# Patient Record
Sex: MALE | Race: WHITE | NOT HISPANIC OR LATINO | URBAN - METROPOLITAN AREA
[De-identification: names, ages, dates, MRNs, and addresses within clinical notes are randomized per-mention and may not be internally consistent; named-entity substitution may affect disease eponyms.]

---

## 2017-04-09 ENCOUNTER — EMERGENCY (EMERGENCY)
Facility: HOSPITAL | Age: 22
LOS: 1 days | Discharge: OTHER INSTITUTION | End: 2017-04-09
Attending: EMERGENCY MEDICINE | Admitting: EMERGENCY MEDICINE
Payer: COMMERCIAL

## 2017-04-09 VITALS — WEIGHT: 199.96 LBS | HEIGHT: 70 IN

## 2017-04-09 DIAGNOSIS — R69 ILLNESS, UNSPECIFIED: ICD-10-CM

## 2017-04-09 DIAGNOSIS — R44.3 HALLUCINATIONS, UNSPECIFIED: ICD-10-CM

## 2017-04-09 DIAGNOSIS — F20.0 PARANOID SCHIZOPHRENIA: ICD-10-CM

## 2017-04-09 LAB
ALBUMIN SERPL ELPH-MCNC: 5.1 G/DL — SIGNIFICANT CHANGE UP (ref 3.3–5.2)
ALP SERPL-CCNC: 74 U/L — SIGNIFICANT CHANGE UP (ref 40–120)
ALT FLD-CCNC: 57 U/L — HIGH
AMPHET UR-MCNC: NEGATIVE — SIGNIFICANT CHANGE UP
ANION GAP SERPL CALC-SCNC: 10 MMOL/L — SIGNIFICANT CHANGE UP (ref 5–17)
APPEARANCE UR: CLEAR — SIGNIFICANT CHANGE UP
AST SERPL-CCNC: 33 U/L — SIGNIFICANT CHANGE UP
BARBITURATES UR SCN-MCNC: NEGATIVE — SIGNIFICANT CHANGE UP
BASOPHILS # BLD AUTO: 0 K/UL — SIGNIFICANT CHANGE UP (ref 0–0.2)
BASOPHILS NFR BLD AUTO: 0.1 % — SIGNIFICANT CHANGE UP (ref 0–2)
BENZODIAZ UR-MCNC: NEGATIVE — SIGNIFICANT CHANGE UP
BILIRUB SERPL-MCNC: 0.5 MG/DL — SIGNIFICANT CHANGE UP (ref 0.4–2)
BILIRUB UR-MCNC: NEGATIVE — SIGNIFICANT CHANGE UP
BUN SERPL-MCNC: 14 MG/DL — SIGNIFICANT CHANGE UP (ref 8–20)
CALCIUM SERPL-MCNC: 10.5 MG/DL — HIGH (ref 8.6–10.2)
CHLORIDE SERPL-SCNC: 96 MMOL/L — LOW (ref 98–107)
CO2 SERPL-SCNC: 28 MMOL/L — SIGNIFICANT CHANGE UP (ref 22–29)
COCAINE METAB.OTHER UR-MCNC: NEGATIVE — SIGNIFICANT CHANGE UP
COLOR SPEC: YELLOW — SIGNIFICANT CHANGE UP
CREAT SERPL-MCNC: 0.94 MG/DL — SIGNIFICANT CHANGE UP (ref 0.5–1.3)
DIFF PNL FLD: NEGATIVE — SIGNIFICANT CHANGE UP
EOSINOPHIL # BLD AUTO: 0 K/UL — SIGNIFICANT CHANGE UP (ref 0–0.5)
EOSINOPHIL NFR BLD AUTO: 0.4 % — SIGNIFICANT CHANGE UP (ref 0–5)
EPI CELLS # UR: SIGNIFICANT CHANGE UP
GLUCOSE SERPL-MCNC: 97 MG/DL — SIGNIFICANT CHANGE UP (ref 70–115)
GLUCOSE UR QL: NEGATIVE MG/DL — SIGNIFICANT CHANGE UP
HCT VFR BLD CALC: 45.5 % — SIGNIFICANT CHANGE UP (ref 42–52)
HGB BLD-MCNC: 15.8 G/DL — SIGNIFICANT CHANGE UP (ref 14–18)
KETONES UR-MCNC: NEGATIVE — SIGNIFICANT CHANGE UP
LEUKOCYTE ESTERASE UR-ACNC: ABNORMAL
LYMPHOCYTES # BLD AUTO: 1.3 K/UL — SIGNIFICANT CHANGE UP (ref 1–4.8)
LYMPHOCYTES # BLD AUTO: 18.1 % — LOW (ref 20–55)
MCHC RBC-ENTMCNC: 31.9 PG — HIGH (ref 27–31)
MCHC RBC-ENTMCNC: 34.7 G/DL — SIGNIFICANT CHANGE UP (ref 32–36)
MCV RBC AUTO: 91.9 FL — SIGNIFICANT CHANGE UP (ref 80–94)
METHADONE UR-MCNC: NEGATIVE — SIGNIFICANT CHANGE UP
MONOCYTES # BLD AUTO: 0.6 K/UL — SIGNIFICANT CHANGE UP (ref 0–0.8)
MONOCYTES NFR BLD AUTO: 7.8 % — SIGNIFICANT CHANGE UP (ref 3–10)
NEUTROPHILS # BLD AUTO: 5.4 K/UL — SIGNIFICANT CHANGE UP (ref 1.8–8)
NEUTROPHILS NFR BLD AUTO: 73.2 % — HIGH (ref 37–73)
NITRITE UR-MCNC: NEGATIVE — SIGNIFICANT CHANGE UP
OPIATES UR-MCNC: NEGATIVE — SIGNIFICANT CHANGE UP
PCP SPEC-MCNC: SIGNIFICANT CHANGE UP
PCP UR-MCNC: NEGATIVE — SIGNIFICANT CHANGE UP
PH UR: 6 — SIGNIFICANT CHANGE UP (ref 4.8–8)
PLATELET # BLD AUTO: 185 K/UL — SIGNIFICANT CHANGE UP (ref 150–400)
POTASSIUM SERPL-MCNC: 4.9 MMOL/L — SIGNIFICANT CHANGE UP (ref 3.5–5.3)
POTASSIUM SERPL-SCNC: 4.9 MMOL/L — SIGNIFICANT CHANGE UP (ref 3.5–5.3)
PROT SERPL-MCNC: 8.1 G/DL — SIGNIFICANT CHANGE UP (ref 6.6–8.7)
PROT UR-MCNC: NEGATIVE MG/DL — SIGNIFICANT CHANGE UP
RBC # BLD: 4.95 M/UL — SIGNIFICANT CHANGE UP (ref 4.6–6.2)
RBC # FLD: 12.9 % — SIGNIFICANT CHANGE UP (ref 11–15.6)
SODIUM SERPL-SCNC: 134 MMOL/L — LOW (ref 135–145)
SP GR SPEC: 1.02 — SIGNIFICANT CHANGE UP (ref 1.01–1.02)
THC UR QL: NEGATIVE — SIGNIFICANT CHANGE UP
UROBILINOGEN FLD QL: NEGATIVE MG/DL — SIGNIFICANT CHANGE UP
WBC # BLD: 7.4 K/UL — SIGNIFICANT CHANGE UP (ref 4.8–10.8)
WBC # FLD AUTO: 7.4 K/UL — SIGNIFICANT CHANGE UP (ref 4.8–10.8)
WBC UR QL: SIGNIFICANT CHANGE UP

## 2017-04-09 PROCEDURE — 99285 EMERGENCY DEPT VISIT HI MDM: CPT

## 2017-04-09 PROCEDURE — 93010 ELECTROCARDIOGRAM REPORT: CPT

## 2017-04-09 RX ORDER — HALOPERIDOL DECANOATE 100 MG/ML
5 INJECTION INTRAMUSCULAR ONCE
Qty: 0 | Refills: 0 | Status: COMPLETED | OUTPATIENT
Start: 2017-04-09 | End: 2017-04-09

## 2017-04-09 RX ORDER — DIPHENHYDRAMINE HCL 50 MG
50 CAPSULE ORAL ONCE
Qty: 0 | Refills: 0 | Status: COMPLETED | OUTPATIENT
Start: 2017-04-09 | End: 2017-04-09

## 2017-04-09 RX ORDER — DIPHENHYDRAMINE HCL 50 MG
50 CAPSULE ORAL EVERY 4 HOURS
Qty: 0 | Refills: 0 | Status: DISCONTINUED | OUTPATIENT
Start: 2017-04-09 | End: 2017-04-10

## 2017-04-09 RX ADMIN — Medication 50 MILLIGRAM(S): at 12:26

## 2017-04-09 RX ADMIN — HALOPERIDOL DECANOATE 5 MILLIGRAM(S): 100 INJECTION INTRAMUSCULAR at 18:54

## 2017-04-09 RX ADMIN — Medication 2 MILLIGRAM(S): at 12:15

## 2017-04-09 RX ADMIN — Medication 50 MILLIGRAM(S): at 18:55

## 2017-04-09 RX ADMIN — HALOPERIDOL DECANOATE 5 MILLIGRAM(S): 100 INJECTION INTRAMUSCULAR at 12:15

## 2017-04-09 RX ADMIN — Medication 2 MILLIGRAM(S): at 18:54

## 2017-04-09 NOTE — ED STATDOCS - NS ED MD SCRIBE ATTENDING SCRIBE SECTIONS
PHYSICAL EXAM/PAST MEDICAL/SURGICAL/SOCIAL HISTORY/INTAKE ASSESSMENT/SCREENINGS/RESULTS/REVIEW OF SYSTEMS/HISTORY OF PRESENT ILLNESS/HIV/CONSULTATIONS/SHIFT CHANGE/PROGRESS NOTE/DISPOSITION/VITAL SIGNS( Pullset)

## 2017-04-09 NOTE — ED ADULT NURSE NOTE - OBJECTIVE STATEMENT
Information received from Pt's family. no known past psychiatric problems. Family hx of anxiety in distant cousins. Pt has started college and has used [employer] and mushroom once while away. Has joined a fraternity with severe hazing. Pt was in [facility] recently and started on Risperidone 0.5 mg  prior to severe behavior. While in room [facility] began to severely decompensate require Security hands on. Pt doing hand stands , jumping off the bed and standing over roommate. NKA, No medical issues. V/S stable at this time

## 2017-04-09 NOTE — ED ADULT NURSE NOTE - NS ED NURSE LEVEL OF CONSCIOUSNESS ORIENTATION
Oriented - self; Oriented - place; Oriented - time/Asking repetitive questions/Hallucination - audio

## 2017-04-09 NOTE — ED ADULT NURSE NOTE - PSYCHOSOCIAL OBSERVED STATE
talkative/asks questions/demanding/Pt judgement is impaired, thought process is clouded, appears to respond to internal stim. Thought blocking/distractible/uncooperative/response inappropriate for situation/stares into space/animated

## 2017-04-09 NOTE — ED ADULT NURSE REASSESSMENT NOTE - NS ED NURSE REASSESS COMMENT FT1
Assumed care of pt @23:30. Pt currently calm and nonaggressive. Pt currently resting/sleeping comfortably. Pt on constant observation. 1:1 by bedside. Will continue to monitor pt for safety.

## 2017-04-09 NOTE — ED BEHAVIORAL HEALTH ASSESSMENT NOTE - HPI (INCLUDE ILLNESS QUALITY, SEVERITY, DURATION, TIMING, CONTEXT, MODIFYING FACTORS, ASSOCIATED SIGNS AND SYMPTOMS)
Pt. is 20 yo male who presents to the ED for bizarre, psychotic behavior.  Pt. is extremely poor historian.  When asked questions he was unable to answer.  He was guarded, suspicious, staring at the , appears to be responding to internal stimuli.  Parents report has been acting strangely for several months before he went away to college.  He went away to college at [employer] and when he came back for winter break they noticed he was even more disorganized with strange behavior, calling people by other people's names.  He told them he had used K2 and Mollies as well as being "psychiatrically hazed" by a fraternity.  He was sent back to school but called and told them to come get him after one week.  He was seen for assessment by Dr. Delgado in [place] who did not prescribe any medication.  They took him to [facility] on March 29th and he was sent to [facility] where he was started on Risperdal 0.5mg BID and discharged on 4/4/17.  Father reports he has become more bizarre,  staring into space, catatonic at times.  Mother states he was diagnosed with ADHD when he was younger but the father would not allow him to be medicated or put into inclusion classes.  She reports he has always struggled academically.  Family minimizes any mention of family psychiatric illness history.

## 2017-04-09 NOTE — ED BEHAVIORAL HEALTH ASSESSMENT NOTE - SUMMARY
Pt. is a 20yo male who is psychotic, jumping, doing somersaults, appears to be responding to internal stimuli.  He is guarded and paranoid, calling people by other's names, confused, FOI.  His parents report he has been acting strangely since he returned from college.  He was admitted to [facility] on March 29th and discharged on April 4th.  He was prescribed Risperdal 0.5mg BID.  Pt. requires inpatient psychiatric for stabilization.

## 2017-04-09 NOTE — ED ADULT NURSE REASSESSMENT NOTE - NS ED NURSE REASSESS COMMENT FT1
Patient medicated as he presents disorganized with pressured, hyperverbal speech. Was rolling on floor in his room, exposing self to female staff. Unable to follow staff direction, and NP ordered stat medication to help alleviate psychotic symptoms. Administered Ativan 2mg IM, Haldol 5mg IM, and Benadryl 50mg IM at 1215 to R Dorsogluteal. patient more calm and in better behavioral control. Continuing to monitor effectiveness of medication.

## 2017-04-09 NOTE — ED ADULT NURSE NOTE - CAS EDN DISCHARGE ASSESSMENT
Alert and oriented to person, place and time Alert and oriented to person, place and time/cooperative with transfer

## 2017-04-09 NOTE — ED STATDOCS - PROGRESS NOTE DETAILS
asked by CHAN nrusing to put formal transfer in to [facility] . patient cared for by Dr haile. I assessed patient and he appears stable for tx to [facility]

## 2017-04-09 NOTE — ED BEHAVIORAL HEALTH ASSESSMENT NOTE - DESCRIPTION
Pt. agitated, bizarre, jumping around, doing somersaults in his room.  He was not able to be redirected or calmed.  Pt. was given Ativan 2mg, Haldol 5mg and 50mg Benadryl IM for safety with the aide of security. None reported Lives with family

## 2017-04-09 NOTE — ED ADULT NURSE NOTE - UNABLE TO OBTAIN
Urgent need for Intervention Pt hallucinating, in need of immediate psychiatric intervention. Pt escorted to room. Jumping off bed, exposing self

## 2017-04-09 NOTE — ED ADULT NURSE NOTE - ED STAT RN HANDOFF DETAILS
Informed of recent medication of Pt with Haldol 5, Ativan 2 & Benedryl 50mg. Pt sleeping currently. Maintained on 1:1

## 2017-04-09 NOTE — ED BEHAVIORAL HEALTH ASSESSMENT NOTE - OTHER
Disorganized Appears to be responding to internal stimuli Paranoid, disorganized, unable to care for himself [id number]

## 2017-04-09 NOTE — ED STATDOCS - OBJECTIVE STATEMENT
20 y/o male with a hx of Schizophreniform illness BIB parents presents to the ED from [facility] c/o odd behavior that onset today. Pt's parents notes that pt was recently placed on  risperidone 0.5mg and pt has been twitching and repeating words. Note that it was never this bad. No further complaints at this time. 22 y/o male with a hx of Schizophreniform illness BIB parents presents to the ED from [facility] c/o odd behavior that onset today. Pt's parents notes that pt was recently placed on  risperidone 0.5mg and pt has been twitching and repeating words. Father notes that pt tried K2 once, and Edwina once.  Note that it was never this bad. No further complaints at this time.

## 2017-04-09 NOTE — ED ADULT NURSE REASSESSMENT NOTE - REASSESS COMMUNICATION
Pt's father called and wants all staff to be aware both the  at [facility] and the information he googled report Pt's behavior is a reaction to Risperidone. He does not want the pt to receive the medication here or at next facility.  Encouraged father to speak with Admitting facility re: care of his son. We will advise destination and time of departure. Parents are very involved and resistant to diagnosis of schizophrenia
Spoke to Pt's father J Luis. Support and encouragement given. Encouragement given to contact [facility] and become envolved with support group to assist the as Sons illness is over whelming. Father is willing . Requests call for dispo placement when made. Social work made aware.

## 2017-04-09 NOTE — ED ADULT NURSE NOTE - CHIEF COMPLAINT QUOTE
d/c'd from [facility] for a "first break schizo attack", placed on risperidone 0.5mg, as per father patient is acting bizarre, repeating himself, hallucinating, denies any suicidal or homicidal ideations.

## 2017-04-09 NOTE — ED ADULT NURSE NOTE - CHPI ED SYMPTOMS NEG
no weakness/no disorientation/no suicidal/no weight loss/no change in level of consciousness/no homicidal

## 2017-04-09 NOTE — ED BEHAVIORAL HEALTH ASSESSMENT NOTE - DETAILS
At [facility] last week for a week Family reports Bipolar, Anxiety [facility] - 3/29-4/4/2017 [facility] Consulted with Dr. Obrien SE2

## 2017-04-09 NOTE — ED ADULT TRIAGE NOTE - CHIEF COMPLAINT QUOTE
d/c'd from [facility] for a "first break schizo attack", placed on risperidone 0.5mg, as per father patient is acting bizarre, repeating himself, hallucinating, denies any suicidal or homicidal ideations.

## 2017-04-10 VITALS
OXYGEN SATURATION: 97 % | HEART RATE: 100 BPM | RESPIRATION RATE: 20 BRPM | TEMPERATURE: 98 F | DIASTOLIC BLOOD PRESSURE: 71 MMHG | SYSTOLIC BLOOD PRESSURE: 137 MMHG

## 2017-04-10 PROCEDURE — 80053 COMPREHEN METABOLIC PANEL: CPT

## 2017-04-10 PROCEDURE — 85027 COMPLETE CBC AUTOMATED: CPT

## 2017-04-10 PROCEDURE — 99285 EMERGENCY DEPT VISIT HI MDM: CPT | Mod: 25

## 2017-04-10 PROCEDURE — 93005 ELECTROCARDIOGRAM TRACING: CPT

## 2017-04-10 PROCEDURE — 96372 THER/PROPH/DIAG INJ SC/IM: CPT

## 2017-04-10 PROCEDURE — 81001 URINALYSIS AUTO W/SCOPE: CPT

## 2017-04-10 PROCEDURE — 80307 DRUG TEST PRSMV CHEM ANLYZR: CPT

## 2017-04-10 RX ADMIN — Medication 1 MILLIGRAM(S): at 11:14

## 2017-04-10 NOTE — ED ADULT NURSE REASSESSMENT NOTE - GENERAL PATIENT STATE
Manic/anxious
anxious
comfortable appearance/resting/sleeping
cooperative/comfortable appearance/resting/sleeping
resting/sleeping/comfortable appearance
resting/sleeping/cooperative/comfortable appearance
anxious

## 2017-04-10 NOTE — ED ADULT NURSE REASSESSMENT NOTE - CONDITION
unchanged
Pt awake, increasing agitation and paranoia. Attempting to find his parents. ARNP to place medication orders. Security on unit. !;! with Pt. Pt verbally de-escalated. Sitting in a chair in his room../deteriorated
deteriorated/Pt behavior escalating. Demanding to leave, eschrolalia, atamping foot to beat of statement.  Pt medicated with Haldol 5, Benadryl 50mg, and Ativan 2 mg IM in Left dorsogluteal  muscle. Pt resting . no complaints at present time
unchanged
unchanged/Pt maintained on !;! for safety and behavior. Pt exposes himself to staff and makes sexual inuendoss . Pt given lunch wich he stood and ate. Given disposable underwear. Karli did put them on. Will continue to monitor.  remains on 1:1

## 2017-04-10 NOTE — ED ADULT NURSE REASSESSMENT NOTE - STATUS
awaiting transfer, no change
awaiting bed, no change
awaiting bed, no change/Pt staying in  overnight. No available beds tonight in any inpatient psychiatric facility.
awaiting dispo 1:1 maintained
awaiting dispo.
awaiting transfer, no change
will monitor  behavior . Medicate if behavior escalates

## 2017-04-10 NOTE — ED ADULT NURSE REASSESSMENT NOTE - TEMPLATE LIST FOR HEAD TO TOE ASSESSMENT
Psych/Behavioral

## 2017-04-10 NOTE — ED BEHAVIORAL HEALTH NOTE - BEHAVIORAL HEALTH NOTE
chart reviewed and patient seen  remains thought disordered and delusional  agree w plan for re-admission to [facility]  will place on involuntary status 9.37 application completed

## 2017-04-10 NOTE — ED ADULT NURSE REASSESSMENT NOTE - NS ED NURSE REASSESS COMMENT FT1
Pt currently calm and non-aggressive. Pt sleeping/resting comfortably. Pt on constant observation. Safety maintained. Will continue to monitor pt for safety.

## 2017-04-10 NOTE — ED BEHAVIORAL HEALTH NOTE - BEHAVIORAL HEALTH NOTE
Social work note: Pt evaluated by Psychiatrist and requiring in patient treatment at this time.  Worker placed call to [facility] and spoke to [facility].  [facility] reviewed chart and accepted pt.  Pt's parents updated as to placement.  Worker obtained insurance authorization through [employer], [phone].  Authorization obtained from Jonelle for 2 days with review on 4/11 to [employer], [phone], authorization number [id number].  Authorization number provided to [facility] at time of set up.  Pt pending transfer to [facility].

## 2017-04-10 NOTE — ED ADULT NURSE REASSESSMENT NOTE - COMFORT CARE
repositioned/darkened lights/wait time explained
repositioned/po fluids offered/darkened lights
side rails down
meal provided/po fluids offered/disposable underwear given

## 2022-11-04 NOTE — ED ADULT NURSE NOTE - FAMILY HISTORY
Current diet order meets estimated nutrient requirements No pertinent family history in first degree relatives

## 2024-04-24 NOTE — ED BEHAVIORAL HEALTH ASSESSMENT NOTE - NS ED BHA MED ROS HEMATOLOGIC LYMPHATIC
Quality 130: Documentation Of Current Medications In The Medical Record: Current Medications Documented Detail Level: Detailed No complaints